# Patient Record
Sex: FEMALE | Race: WHITE | URBAN - NONMETROPOLITAN AREA
[De-identification: names, ages, dates, MRNs, and addresses within clinical notes are randomized per-mention and may not be internally consistent; named-entity substitution may affect disease eponyms.]

---

## 2021-11-18 ENCOUNTER — OFFICE VISIT (OUTPATIENT)
Dept: URBAN - NONMETROPOLITAN AREA CLINIC 2 | Facility: CLINIC | Age: 86
End: 2021-11-18
Payer: MEDICARE

## 2021-11-18 ENCOUNTER — TELEPHONE ENCOUNTER (OUTPATIENT)
Dept: URBAN - NONMETROPOLITAN AREA CLINIC 2 | Facility: CLINIC | Age: 86
End: 2021-11-18

## 2021-11-18 VITALS
WEIGHT: 172 LBS | HEIGHT: 62 IN | SYSTOLIC BLOOD PRESSURE: 144 MMHG | TEMPERATURE: 97.2 F | BODY MASS INDEX: 31.65 KG/M2 | DIASTOLIC BLOOD PRESSURE: 84 MMHG | HEART RATE: 84 BPM

## 2021-11-18 DIAGNOSIS — K62.5 RECTAL BLEEDING: ICD-10-CM

## 2021-11-18 DIAGNOSIS — K59.01 SLOW TRANSIT CONSTIPATION: ICD-10-CM

## 2021-11-18 DIAGNOSIS — Z12.11 COLON CANCER SCREENING: ICD-10-CM

## 2021-11-18 PROCEDURE — 99204 OFFICE O/P NEW MOD 45 MIN: CPT | Performed by: NURSE PRACTITIONER

## 2021-11-18 RX ORDER — LISINOPRIL 5 MG/1
1 TABLET TABLET ORAL ONCE A DAY
Status: ACTIVE | COMMUNITY

## 2021-11-18 RX ORDER — GLIPIZIDE 5 MG/1
1 TABLET 30 MINUTES BEFORE BREAKFAST TABLET ORAL ONCE A DAY
Status: ACTIVE | COMMUNITY

## 2021-11-18 RX ORDER — RIVAROXABAN 20 MG/1
1 TABLET WITH FOOD TABLET, FILM COATED ORAL ONCE A DAY
Status: ACTIVE | COMMUNITY

## 2021-11-18 RX ORDER — METRONIDAZOLE 7.5 MG/G
1 APPLICATION CREAM TOPICAL TWICE A DAY
Qty: 1 TUBE | Refills: 1 | OUTPATIENT
Start: 2021-11-18 | End: 2021-12-16

## 2021-11-18 RX ORDER — LORATADINE 10 MG
1 PACKET MIXED WITH 8 OUNCES OF FLUID TABLET,DISINTEGRATING ORAL ONCE A DAY
Qty: 90 PACKETS | Refills: 3
Start: 2021-11-18 | End: 2022-11-17

## 2021-11-18 RX ORDER — FUROSEMIDE 20 MG/1
1 TABLET TABLET ORAL ONCE A DAY
Status: ACTIVE | COMMUNITY

## 2021-11-18 RX ORDER — PSYLLIUM HUSK 0.4 G
2 CAPSULES WITH 8 OUNCES OF LIQUID CAPSULE ORAL QHS
Qty: 180 CAPSULES | Refills: 3 | OUTPATIENT
Start: 2021-11-18 | End: 2022-11-13

## 2021-11-18 RX ORDER — LORATADINE 10 MG
1 PACKET MIXED WITH 8 OUNCES OF FLUID TABLET,DISINTEGRATING ORAL ONCE A DAY
Qty: 90 PACKETS | Refills: 3 | OUTPATIENT
Start: 2021-11-18 | End: 2022-11-13

## 2021-11-18 RX ORDER — BUDESONIDE AND FORMOTEROL FUMARATE DIHYDRATE 80; 4.5 UG/1; UG/1
2 PUFFS AEROSOL RESPIRATORY (INHALATION) ONCE A DAY
Status: ACTIVE | COMMUNITY

## 2021-11-18 RX ORDER — PSYLLIUM HUSK 0.4 G
2 CAPSULES WITH 8 OUNCES OF LIQUID CAPSULE ORAL QHS
Qty: 180 CAPSULES | Refills: 3
Start: 2021-11-18 | End: 2022-11-17

## 2021-11-18 RX ORDER — METRONIDAZOLE 7.5 MG/G
1 APPLICATION CREAM TOPICAL TWICE A DAY
Qty: 1 TUBE | Refills: 1
Start: 2021-11-18 | End: 2021-12-20

## 2021-11-18 NOTE — HPI-TODAY'S VISIT:
Josie is an 86-year-old female referred to me by Reji Brito MD for rectal bleeding and rectal pain.  A copy this note be sent to the referring physician.  She has had symptoms for several months.  She has chronic constipation and does not take anything regularly for this.  Her last colonoscopy was done around 5 years ago in [place], she thinks that this was normal.  On exam she does have a history of skin tags from healed hemorrhoids and she does have a fissure.  Her primary care physician gave her hemorrhoid ointment with steroids which did not improve the discomfort of the bleeding.  She had blood work done in August with no anemia.  Today she is here for constipation and an anal fissure.  MB

## 2022-02-08 ENCOUNTER — OFFICE VISIT (OUTPATIENT)
Dept: URBAN - NONMETROPOLITAN AREA CLINIC 2 | Facility: CLINIC | Age: 87
End: 2022-02-08

## 2022-02-17 ENCOUNTER — LAB OUTSIDE AN ENCOUNTER (OUTPATIENT)
Dept: URBAN - NONMETROPOLITAN AREA CLINIC 2 | Facility: CLINIC | Age: 87
End: 2022-02-17

## 2022-02-17 ENCOUNTER — OFFICE VISIT (OUTPATIENT)
Dept: URBAN - NONMETROPOLITAN AREA CLINIC 2 | Facility: CLINIC | Age: 87
End: 2022-02-17
Payer: MEDICARE

## 2022-02-17 ENCOUNTER — WEB ENCOUNTER (OUTPATIENT)
Dept: URBAN - NONMETROPOLITAN AREA CLINIC 2 | Facility: CLINIC | Age: 87
End: 2022-02-17

## 2022-02-17 VITALS
SYSTOLIC BLOOD PRESSURE: 115 MMHG | HEART RATE: 78 BPM | DIASTOLIC BLOOD PRESSURE: 71 MMHG | BODY MASS INDEX: 28.16 KG/M2 | TEMPERATURE: 96.5 F | WEIGHT: 153 LBS | HEIGHT: 62 IN

## 2022-02-17 DIAGNOSIS — R19.7 DIARRHEA, UNSPECIFIED TYPE: ICD-10-CM

## 2022-02-17 DIAGNOSIS — K62.5 RECTAL BLEEDING: ICD-10-CM

## 2022-02-17 DIAGNOSIS — R10.31 RLQ ABDOMINAL PAIN: ICD-10-CM

## 2022-02-17 DIAGNOSIS — K59.01 SLOW TRANSIT CONSTIPATION: ICD-10-CM

## 2022-02-17 DIAGNOSIS — Z12.11 COLON CANCER SCREENING: ICD-10-CM

## 2022-02-17 PROCEDURE — 99214 OFFICE O/P EST MOD 30 MIN: CPT | Performed by: NURSE PRACTITIONER

## 2022-02-17 RX ORDER — FUROSEMIDE 20 MG/1
1 TABLET TABLET ORAL ONCE A DAY
Status: ACTIVE | COMMUNITY

## 2022-02-17 RX ORDER — BUDESONIDE AND FORMOTEROL FUMARATE DIHYDRATE 80; 4.5 UG/1; UG/1
2 PUFFS AEROSOL RESPIRATORY (INHALATION) ONCE A DAY
Status: ACTIVE | COMMUNITY

## 2022-02-17 RX ORDER — DICYCLOMINE HYDROCHLORIDE 10 MG/1
TID AC PRN DIARRHEA/CRAMPING CAPSULE ORAL THREE TIMES A DAY
Qty: 60 CAPSULE | Refills: 3 | OUTPATIENT
Start: 2022-02-17 | End: 2022-06-17

## 2022-02-17 RX ORDER — LISINOPRIL 5 MG/1
1 TABLET TABLET ORAL ONCE A DAY
Status: ACTIVE | COMMUNITY

## 2022-02-17 RX ORDER — GLIPIZIDE 5 MG/1
1 TABLET 30 MINUTES BEFORE BREAKFAST TABLET ORAL ONCE A DAY
Status: ACTIVE | COMMUNITY

## 2022-02-17 RX ORDER — LORATADINE 10 MG
1 PACKET MIXED WITH 8 OUNCES OF FLUID TABLET,DISINTEGRATING ORAL ONCE A DAY
Qty: 90 PACKETS | Refills: 3 | Status: ACTIVE | COMMUNITY
Start: 2021-11-18 | End: 2022-11-17

## 2022-02-17 RX ORDER — PSYLLIUM HUSK 0.4 G
2 CAPSULES WITH 8 OUNCES OF LIQUID CAPSULE ORAL QHS
Qty: 180 CAPSULES | Refills: 3 | Status: ACTIVE | COMMUNITY
Start: 2021-11-18 | End: 2022-11-17

## 2022-02-17 RX ORDER — RIVAROXABAN 20 MG/1
1 TABLET WITH FOOD TABLET, FILM COATED ORAL ONCE A DAY
Status: ACTIVE | COMMUNITY

## 2022-02-17 NOTE — HPI-TODAY'S VISIT:
Josie is an 86-year-old female referred to me by Reji Brito MD for rectal bleeding and rectal pain.  A copy this note be sent to the referring physician.  She has had symptoms for several months.  She has chronic constipation and does not take anything regularly for this.  Her last colonoscopy was done around 5 years ago in [place], she thinks that this was normal.  On exam she does have a history of skin tags from healed hemorrhoids and she does have a fissure.  Her primary care physician gave her hemorrhoid ointment with steroids which did not improve the discomfort of the bleeding.  She had blood work done in August with no anemia.  Today she is here for constipation and an anal fissure.  MB   2/17/2022 Josie presents for evaluation of acute RLQ abdominal pain, diarrhea and rectal bleeding. The symptoms began 2/4/2022. She has recently been treated with antibiotics for a UTI. She is having 3-6 urgent BMs daily with [facility]. She has a history of hemorrhoids and constipation. Her stools are narrow and loose. She denies fever with the symptoms. The pain is progressively worsening. She has no fever or chills. She almost went to the ER this weekend. She tried pepto bismol with no relief. She has no history of diverticulitis that she is aware. Her daughter is down from [place]. Her last colonoscopy was around 5 years ago in [place] and normal per her report. MB

## 2022-02-18 ENCOUNTER — LAB OUTSIDE AN ENCOUNTER (OUTPATIENT)
Dept: URBAN - METROPOLITAN AREA CLINIC 92 | Facility: CLINIC | Age: 87
End: 2022-02-18

## 2022-02-18 ENCOUNTER — TELEPHONE ENCOUNTER (OUTPATIENT)
Dept: URBAN - METROPOLITAN AREA CLINIC 92 | Facility: CLINIC | Age: 87
End: 2022-02-18

## 2022-02-25 ENCOUNTER — TELEPHONE ENCOUNTER (OUTPATIENT)
Dept: URBAN - NONMETROPOLITAN AREA CLINIC 13 | Facility: CLINIC | Age: 87
End: 2022-02-25

## 2022-03-30 ENCOUNTER — OFFICE VISIT (OUTPATIENT)
Dept: URBAN - NONMETROPOLITAN AREA CLINIC 2 | Facility: CLINIC | Age: 87
End: 2022-03-30
Payer: MEDICARE

## 2022-03-30 ENCOUNTER — DASHBOARD ENCOUNTERS (OUTPATIENT)
Age: 87
End: 2022-03-30

## 2022-03-30 VITALS
HEART RATE: 75 BPM | BODY MASS INDEX: 30 KG/M2 | HEIGHT: 62 IN | WEIGHT: 163 LBS | DIASTOLIC BLOOD PRESSURE: 74 MMHG | TEMPERATURE: 97.6 F | SYSTOLIC BLOOD PRESSURE: 135 MMHG

## 2022-03-30 DIAGNOSIS — K62.5 RECTAL BLEEDING: ICD-10-CM

## 2022-03-30 DIAGNOSIS — Z12.11 COLON CANCER SCREENING: ICD-10-CM

## 2022-03-30 DIAGNOSIS — R10.31 RLQ ABDOMINAL PAIN: ICD-10-CM

## 2022-03-30 DIAGNOSIS — R19.7 DIARRHEA, UNSPECIFIED TYPE: ICD-10-CM

## 2022-03-30 DIAGNOSIS — K59.01 SLOW TRANSIT CONSTIPATION: ICD-10-CM

## 2022-03-30 PROBLEM — 62315008: Status: ACTIVE | Noted: 2022-02-17

## 2022-03-30 PROBLEM — 35298007: Status: ACTIVE | Noted: 2021-11-18

## 2022-03-30 PROBLEM — 305058001: Status: ACTIVE | Noted: 2021-11-18

## 2022-03-30 PROBLEM — 12063002: Status: ACTIVE | Noted: 2021-11-18

## 2022-03-30 PROBLEM — 301754002: Status: ACTIVE | Noted: 2022-02-17

## 2022-03-30 PROCEDURE — 99214 OFFICE O/P EST MOD 30 MIN: CPT | Performed by: NURSE PRACTITIONER

## 2022-03-30 RX ORDER — GLIPIZIDE 5 MG/1
1 TABLET 30 MINUTES BEFORE BREAKFAST TABLET ORAL ONCE A DAY
Status: ACTIVE | COMMUNITY

## 2022-03-30 RX ORDER — RIVAROXABAN 20 MG/1
1 TABLET WITH FOOD TABLET, FILM COATED ORAL ONCE A DAY
Status: ACTIVE | COMMUNITY

## 2022-03-30 RX ORDER — LISINOPRIL 5 MG/1
1 TABLET TABLET ORAL ONCE A DAY
Status: ACTIVE | COMMUNITY

## 2022-03-30 RX ORDER — LORATADINE 10 MG
1 PACKET MIXED WITH 8 OUNCES OF FLUID TABLET,DISINTEGRATING ORAL ONCE A DAY
Qty: 90 PACKETS | Refills: 3 | Status: ACTIVE | COMMUNITY
Start: 2021-11-18 | End: 2022-11-17

## 2022-03-30 RX ORDER — PSYLLIUM HUSK 0.4 G
2 CAPSULES WITH 8 OUNCES OF LIQUID CAPSULE ORAL QHS
Qty: 180 CAPSULES | Refills: 3 | Status: ACTIVE | COMMUNITY
Start: 2021-11-18 | End: 2022-11-17

## 2022-03-30 RX ORDER — BUDESONIDE AND FORMOTEROL FUMARATE DIHYDRATE 80; 4.5 UG/1; UG/1
2 PUFFS AEROSOL RESPIRATORY (INHALATION) ONCE A DAY
Status: ACTIVE | COMMUNITY

## 2022-03-30 RX ORDER — DICYCLOMINE HYDROCHLORIDE 10 MG/1
TID AC PRN DIARRHEA/CRAMPING CAPSULE ORAL THREE TIMES A DAY
Qty: 60 CAPSULE | Refills: 3 | Status: ACTIVE | COMMUNITY
Start: 2022-02-17 | End: 2022-06-17

## 2022-03-30 RX ORDER — FUROSEMIDE 20 MG/1
1 TABLET TABLET ORAL ONCE A DAY
Status: ACTIVE | COMMUNITY

## 2022-03-30 NOTE — HPI-TODAY'S VISIT:
Josie is an 86-year-old female referred to me by Reji Brito MD for rectal bleeding and rectal pain.  A copy this note be sent to the referring physician.  She has had symptoms for several months.  She has chronic constipation and does not take anything regularly for this.  Her last colonoscopy was done around 5 years ago in [place], she thinks that this was normal.  On exam she does have a history of skin tags from healed hemorrhoids and she does have a fissure.  Her primary care physician gave her hemorrhoid ointment with steroids which did not improve the discomfort of the bleeding.  She had blood work done in August with no anemia.  Today she is here for constipation and an anal fissure.  MB   2/17/2022 Josie presents for evaluation of acute RLQ abdominal pain, diarrhea and rectal bleeding. The symptoms began 2/4/2022. She has recently been treated with antibiotics for a UTI. She is having 3-6 urgent BMs daily with [facility]. She has a history of hemorrhoids and constipation. Her stools are narrow and loose. She denies fever with the symptoms. The pain is progressively worsening. She has no fever or chills. She almost went to the ER this weekend. She tried pepto bismol with no relief. She has no history of diverticulitis that she is aware. Her daughter is down from [place]. Her last colonoscopy was around 5 years ago in [place] and normal per her report. MB 3/30/2022 Mrs. Proctor presents for follow-up of abdominal pain and diarrhea.  Her labs and UA are normal.  Her's contrasted CT scan is normal.  She is not taking the fiber MiraLAX or the dicyclomine.  Her bowels have normalized.  I do suspect she had an acute viral illness.  She still has occasional cramping.  I encouraged her to use the dicyclomine just as needed.  I would like her to start taking Metamucil daily to avoid overflow diarrhea or constipation.  We will hold off on colonoscopy given the improvement in her symptoms.  MB

## 2022-04-14 ENCOUNTER — OFFICE VISIT (OUTPATIENT)
Dept: URBAN - METROPOLITAN AREA MEDICAL CENTER 1 | Facility: MEDICAL CENTER | Age: 87
End: 2022-04-14

## 2022-06-01 ENCOUNTER — TELEPHONE ENCOUNTER (OUTPATIENT)
Dept: URBAN - METROPOLITAN AREA CLINIC 92 | Facility: CLINIC | Age: 87
End: 2022-06-01

## 2022-06-01 RX ORDER — DICYCLOMINE HYDROCHLORIDE 10 MG/1
TID AC PRN DIARRHEA/CRAMPING CAPSULE ORAL THREE TIMES A DAY
Qty: 60 CAPSULE | Refills: 3 | Status: ACTIVE | COMMUNITY
Start: 2022-02-17 | End: 2022-06-17

## 2022-06-01 RX ORDER — METRONIDAZOLE 7.5 MG/G
1 APPLICATION CREAM TOPICAL TWICE A DAY
Qty: 1 TUBE | Refills: 1 | OUTPATIENT
Start: 2022-06-01 | End: 2022-06-29

## 2022-06-01 RX ORDER — FUROSEMIDE 20 MG/1
1 TABLET TABLET ORAL ONCE A DAY
Status: ACTIVE | COMMUNITY

## 2022-06-01 RX ORDER — LISINOPRIL 5 MG/1
1 TABLET TABLET ORAL ONCE A DAY
Status: ACTIVE | COMMUNITY

## 2022-06-01 RX ORDER — RIVAROXABAN 20 MG/1
1 TABLET WITH FOOD TABLET, FILM COATED ORAL ONCE A DAY
Status: ACTIVE | COMMUNITY

## 2022-06-01 RX ORDER — LORATADINE 10 MG
1 PACKET MIXED WITH 8 OUNCES OF FLUID TABLET,DISINTEGRATING ORAL ONCE A DAY
Qty: 90 PACKETS | Refills: 3 | Status: ACTIVE | COMMUNITY
Start: 2021-11-18 | End: 2022-11-17

## 2022-06-01 RX ORDER — PSYLLIUM HUSK 0.4 G
2 CAPSULES WITH 8 OUNCES OF LIQUID CAPSULE ORAL QHS
Qty: 180 CAPSULES | Refills: 3 | Status: ACTIVE | COMMUNITY
Start: 2021-11-18 | End: 2022-11-17

## 2022-06-01 RX ORDER — GLIPIZIDE 5 MG/1
1 TABLET 30 MINUTES BEFORE BREAKFAST TABLET ORAL ONCE A DAY
Status: ACTIVE | COMMUNITY

## 2022-06-01 RX ORDER — BUDESONIDE AND FORMOTEROL FUMARATE DIHYDRATE 80; 4.5 UG/1; UG/1
2 PUFFS AEROSOL RESPIRATORY (INHALATION) ONCE A DAY
Status: ACTIVE | COMMUNITY

## 2022-06-09 ENCOUNTER — TELEPHONE ENCOUNTER (OUTPATIENT)
Dept: URBAN - METROPOLITAN AREA CLINIC 92 | Facility: CLINIC | Age: 87
End: 2022-06-09

## 2022-06-09 RX ORDER — METRONIDAZOLE 7.5 MG/G
1 APPLICATION CREAM TOPICAL TWICE A DAY
Qty: 1 TUBE | Refills: 6
Start: 2022-06-01 | End: 2022-09-15

## 2022-09-28 ENCOUNTER — OFFICE VISIT (OUTPATIENT)
Dept: URBAN - NONMETROPOLITAN AREA CLINIC 13 | Facility: CLINIC | Age: 87
End: 2022-09-28